# Patient Record
(demographics unavailable — no encounter records)

---

## 2025-03-28 NOTE — DATA REVIEWED
[FreeTextEntry1] : 3 x-ray views were obtained today of the patient's left ankle.  It shows no obvious evidence of acute fracture or dislocation.  Her distal fibula growth plate is still open.  This was reviewed with the mother and patient.

## 2025-03-28 NOTE — HISTORY OF PRESENT ILLNESS
[de-identified] : The patient presents today with her mother for evaluation of her left ankle.  States that 2 days ago, she was at gymnastics and started to feel pain nursing home through practice in her left ankle.  She does not recall if she had an injury to it.  She just notes that it was uncomfortable.  She does admit that she does approximately 3 hours of gymnastics weekly.  She denies any previous problems in her left ankle.

## 2025-03-28 NOTE — PHYSICAL EXAM
[de-identified] : On examination of the patient's left ankle, there is no edema or ecchymosis.  Skin is intact.  She has no tenderness along the medial or lateral malleolus.  She has tenderness along the ATFL ligament.  No other tenderness about the remainder of the ankle.  No tenderness throughout the metatarsals or throughout the midfoot.  She has full mobility with dorsiflexion and plantarflexion with minimal discomfort.  She has mild pain laterally with inversion.  No pain with eversion.

## 2025-03-28 NOTE — DISCUSSION/SUMMARY
[de-identified] : I recommend rest, anti-inflammatories, and supportive footwear.  The patient does have a tall cam walker boot at home from a relative.  I did advise her that if she is more comfortable walking in this, she can use it as needed, but can also use an Ace bandage and sneaker as necessary.  She will refrain from gym and sports.  We will have her follow-up in approximately 4 weeks time for repeat assessment.  If any issues arise, they will contact the office.